# Patient Record
Sex: MALE | Race: WHITE | NOT HISPANIC OR LATINO | Employment: OTHER | URBAN - METROPOLITAN AREA
[De-identification: names, ages, dates, MRNs, and addresses within clinical notes are randomized per-mention and may not be internally consistent; named-entity substitution may affect disease eponyms.]

---

## 2017-04-13 DIAGNOSIS — E78.5 HYPERLIPIDEMIA LDL GOAL <130: ICD-10-CM

## 2017-04-13 RX ORDER — ATORVASTATIN CALCIUM 20 MG/1
TABLET, FILM COATED ORAL
Qty: 45 TABLET | Refills: 1 | Status: SHIPPED | OUTPATIENT
Start: 2017-04-13 | End: 2018-01-06

## 2017-04-13 NOTE — TELEPHONE ENCOUNTER
Atorvastatin 20mg     Last Written Prescription Date: 04/12/2016 #45 x 3  Last filled - not provided, e-refill request  Last Office Visit with [facility], [facility] or [facility] prescribing provider: 04/18/2017 DANETTE Arias    Next 5 appointments (look out 90 days)     Apr 18, 2017  7:40 AM CDT   PHYSICAL with Tamiko Arias MD   [facility] ([facility])    [address] [facility] [address] [phone]                   Lab Results   Component Value Date    CHOL 153 04/12/2016     Lab Results   Component Value Date    HDL 47 04/12/2016     Lab Results   Component Value Date    LDL 86 04/12/2016     Lab Results   Component Value Date    TRIG 99 04/12/2016     Lab Results   Component Value Date    CHOLHDLRATIO 3.0 06/17/2014

## 2017-04-13 NOTE — TELEPHONE ENCOUNTER
Prescription approved per [facility] Refill Protocol or patient Primary care provider (PCP)  MEGHNA Landers RN/Maximo Morin

## 2017-04-24 ENCOUNTER — OFFICE VISIT (OUTPATIENT)
Dept: FAMILY MEDICINE | Facility: CLINIC | Age: 62
End: 2017-04-24
Payer: COMMERCIAL

## 2017-04-24 VITALS
TEMPERATURE: 98.3 F | SYSTOLIC BLOOD PRESSURE: 130 MMHG | BODY MASS INDEX: 26.22 KG/M2 | HEART RATE: 68 BPM | DIASTOLIC BLOOD PRESSURE: 76 MMHG | HEIGHT: 68 IN | WEIGHT: 173 LBS

## 2017-04-24 DIAGNOSIS — Z79.1 NSAID LONG-TERM USE: ICD-10-CM

## 2017-04-24 DIAGNOSIS — Z12.5 SCREENING FOR PROSTATE CANCER: ICD-10-CM

## 2017-04-24 DIAGNOSIS — G25.0 BENIGN ESSENTIAL TREMOR: Primary | ICD-10-CM

## 2017-04-24 DIAGNOSIS — E78.5 HYPERLIPIDEMIA LDL GOAL <160: ICD-10-CM

## 2017-04-24 DIAGNOSIS — M54.16 LUMBAR RADICULOPATHY: ICD-10-CM

## 2017-04-24 LAB
ANION GAP SERPL CALCULATED.3IONS-SCNC: 8 MMOL/L (ref 3–14)
BUN SERPL-MCNC: 19 MG/DL (ref 7–30)
CALCIUM SERPL-MCNC: 9.1 MG/DL (ref 8.5–10.1)
CHLORIDE SERPL-SCNC: 105 MMOL/L (ref 94–109)
CHOLEST SERPL-MCNC: 132 MG/DL
CO2 SERPL-SCNC: 29 MMOL/L (ref 20–32)
CREAT SERPL-MCNC: 0.8 MG/DL (ref 0.66–1.25)
GFR SERPL CREATININE-BSD FRML MDRD: NORMAL ML/MIN/1.7M2
GLUCOSE SERPL-MCNC: 94 MG/DL (ref 70–99)
HDLC SERPL-MCNC: 58 MG/DL
LDLC SERPL CALC-MCNC: 62 MG/DL
NONHDLC SERPL-MCNC: 74 MG/DL
POTASSIUM SERPL-SCNC: 4 MMOL/L (ref 3.4–5.3)
PSA SERPL-ACNC: 0.57 UG/L (ref 0–4)
SODIUM SERPL-SCNC: 142 MMOL/L (ref 133–144)
TRIGL SERPL-MCNC: 59 MG/DL

## 2017-04-24 PROCEDURE — 99214 OFFICE O/P EST MOD 30 MIN: CPT | Performed by: FAMILY MEDICINE

## 2017-04-24 PROCEDURE — 80061 LIPID PANEL: CPT | Performed by: FAMILY MEDICINE

## 2017-04-24 PROCEDURE — G0103 PSA SCREENING: HCPCS | Performed by: FAMILY MEDICINE

## 2017-04-24 PROCEDURE — 80048 BASIC METABOLIC PNL TOTAL CA: CPT | Performed by: FAMILY MEDICINE

## 2017-04-24 PROCEDURE — 36415 COLL VENOUS BLD VENIPUNCTURE: CPT | Performed by: FAMILY MEDICINE

## 2017-04-24 RX ORDER — PROPRANOLOL HYDROCHLORIDE 80 MG/1
80 CAPSULE, EXTENDED RELEASE ORAL 2 TIMES DAILY
Qty: 180 CAPSULE | Refills: 3 | Status: SHIPPED | OUTPATIENT
Start: 2017-04-24

## 2017-04-24 NOTE — PROGRESS NOTES
"  SUBJECTIVE:                                                    Clinton Suarez is a 61 year old male who presents to clinic today for the following health issues:      Hyperlipidemia Follow-Up  Atorvastatin 10mg qd    Rate your low fat/cholesterol diet?: not monitoring fat    Taking statin?  Yes, no muscle aches from statin    Other lipid medications/supplements?:  none     Medication Followup of essential tremor  Propranolol 80 mg qd    Taking Medication as prescribed: yes    Side Effects:  None    Medication Helping Symptoms:  yes       Amount of exercise or physical activity: 2-3 days/week for an average of 15-30 minutes    Problems taking medications regularly: No    Medication side effects: none    Diet: regular (no restrictions)    - Patient states that his back pain is doing better. He continues to exercise and stretch routinely.       ROS:  Constitutional, HEENT, cardiovascular, pulmonary, gi and gu systems are negative, except as otherwise noted.    This document serves as a record of the services and decisions personally performed and made by Tamiko Arias MD. It was created on his behalf by Flash Ewing, a trained medical scribe. The creation of this document is based the provider's statements to the medical scribe.  Flash Ewing 9:03 AM April 24, 2017      OBJECTIVE:                                                    /76  Pulse 68  Temp 98.3  F (36.8  C) (Tympanic)  Ht 5' 8\" (1.727 m)  Wt 173 lb (78.5 kg)  BMI 26.3 kg/m2  Body mass index is 26.3 kg/(m^2).     GENERAL: healthy, alert and no distress  EYES: Eyes grossly normal to inspection, conjunctivae and sclerae normal  RESP: lungs clear to auscultation - no rales, rhonchi or wheezes  CV: regular rate and rhythm, normal S1 S2, no murmur  MS: no gross musculoskeletal defects noted, no edema  NEURO: Normal strength and tone, mentation intact and speech normal  PSYCH: mentation appears normal, affect normal/bright     "     ASSESSMENT/PLAN:                                                      (G25.0) Benign essential tremor  (primary encounter diagnosis)  Comment: Patient states that symptoms well controlled with current medication. Medication refilled.  Plan: propranolol (INDERAL LA) 80 MG 24 hr capsule    (E78.5) Hyperlipidemia LDL goal <160  Comment: Patient is doing well with moderate intensity statin therapy. Primary prevention.  Plan: Continue to monitor.     (M54.16) Lumbar radiculopathy  Comment: Patient states that his back pain continues to improve. I advised the patient to continue with his home exercise program. Reviewed the need for weight management in the prevention of chronic pain.  Plan: Continue to monitor.       Patient Instructions   *   Keep up with exercises and stretches for the back.     *   Will check cholesterol, blood sugar, kidney function.     *   Colonoscopy in 2021.     *    Yearly check up.     *    No change in medications.         Patient will follow up in 12 months or sooner, PRN. Patient instructed to call with any questions or concerns.    The information in this document, created by a scribe for me, accurately reflects the services I personally performed and the decisions made by me. I have reviewed and approved this document for accuracy. 9:04 AM 4/24/2017      Tamiko Arias MD  [facility]

## 2017-04-24 NOTE — NURSING NOTE
"Chief Complaint   Patient presents with     Lipids       Initial /76  Pulse 68  Temp 98.3  F (36.8  C) (Tympanic)  Ht 5' 8\" (1.727 m)  Wt 173 lb (78.5 kg)  BMI 26.3 kg/m2 Estimated body mass index is 26.3 kg/(m^2) as calculated from the following:    Height as of this encounter: 5' 8\" (1.727 m).    Weight as of this encounter: 173 lb (78.5 kg).  Medication Reconciliation: complete  "

## 2017-04-24 NOTE — MR AVS SNAPSHOT
After Visit Summary   4/24/2017    Clinton Suarez    MRN: [id number]           Patient Information     Date Of Birth          1955        Visit Information        Provider Department      4/24/2017 8:40 AM Tamiko Arias MD [facility]        Today's Diagnoses     Benign essential tremor    -  1    Hyperlipidemia LDL goal <160        Lumbar radiculopathy          Care Instructions    *   Keep up with exercises and stretches for the back.     *   Will check cholesterol, blood sugar, kidney function.     *   Colonoscopy in 2021.     *    Yearly check up.     *    No change in medications.         Follow-ups after your visit        Who to contact     Normal or non-critical lab and imaging results will be communicated to you by [employer], letter or phone within 4 business days after the clinic has received the results. If you do not hear from us within 7 days, please contact the clinic through [employer] or phone. If you have a critical or abnormal lab result, we will notify you by phone as soon as possible.  Submit refill requests through [employer] or call your pharmacy and they will forward the refill request to us. Please allow 3 business days for your refill to be completed.          If you need to speak with a  for additional information , please call: [phone]           Additional Information About Your Visit        [employer] Information     [employer] gives you secure access to your electronic health record. If you see a primary care provider, you can also send messages to your care team and make appointments. If you have questions, please call your primary care clinic.  If you do not have a primary care provider, please call [phone] and they will assist you.        Care EveryWhere ID     This is your Care EveryWhere ID. This could be used by other organizations to access your [place] medical records  [id number]        Your Vitals Were     Pulse  "Temperature Height BMI (Body Mass Index)          68 98.3  F (36.8  C) (Tympanic) 5' 8\" (1.727 m) 26.3 kg/m2         Blood Pressure from Last 3 Encounters:   04/24/17 130/76   04/12/16 138/80   06/19/14 120/74    Weight from Last 3 Encounters:   04/24/17 173 lb (78.5 kg)   04/12/16 165 lb 4 oz (75 kg)   06/19/14 172 lb (78 kg)              Today, you had the following     No orders found for display         Today's Medication Changes          These changes are accurate as of: 4/24/17  9:16 AM.  If you have any questions, ask your nurse or doctor.               These medicines have changed or have updated prescriptions.        Dose/Directions    propranolol 80 MG 24 hr capsule   Commonly known as:  INDERAL LA   This may have changed:    - how much to take  - how to take this  - when to take this  - additional instructions   Used for:  Benign essential tremor   Changed by:  Tamiko Arias MD        Dose:  80 mg   Take 1 capsule (80 mg) by mouth 2 times daily   Quantity:  180 capsule   Refills:  3         Stop taking these medicines if you haven't already. Please contact your care team if you have questions.     diclofenac 75 MG EC tablet   Commonly known as:  VOLTAREN   Stopped by:  Tamiko Arias MD           gabapentin 300 MG capsule   Commonly known as:  NEURONTIN   Stopped by:  Tamiko Arias MD           predniSONE 20 MG tablet   Commonly known as:  DELTASONE   Stopped by:  Tamiko Arias MD                Where to get your medicines      These medications were sent to [facility] - [address], [facility] [address]     Phone:  [phone]     propranolol 80 MG 24 hr capsule                Primary Care Provider Office Phone # Fax #    Tamiko Arias [phone]       [facility] [address] [place] [address]        Thank you!     Thank you for choosing [facility]  for your " care. Our goal is always to provide you with excellent care. Hearing back from our patients is one way we can continue to improve our services. Please take a few minutes to complete the written survey that you may receive in the mail after your visit with us. Thank you!             Your Updated Medication List - Protect others around you: Learn how to safely use, store and throw away your medicines at www.disposemymeds.org.          This list is accurate as of: 4/24/17  9:16 AM.  Always use your most recent med list.                   Brand Name Dispense Instructions for use    aspirin 81 MG tablet      Take 1 tablet by mouth daily.       atorvastatin 20 MG tablet    LIPITOR    45 tablet    Take one-half tablet by  mouth daily FOR  CHOLESTEROL.       propranolol 80 MG 24 hr capsule    INDERAL LA    180 capsule    Take 1 capsule (80 mg) by mouth 2 times daily

## 2017-04-24 NOTE — PATIENT INSTRUCTIONS
*   Keep up with exercises and stretches for the back.     *   Will check cholesterol, blood sugar, kidney function.     *   Colonoscopy in 2021.     *    Yearly check up.     *    No change in medications.

## 2018-01-06 DIAGNOSIS — E78.5 HYPERLIPIDEMIA LDL GOAL <130: ICD-10-CM

## 2018-01-08 RX ORDER — ATORVASTATIN CALCIUM 20 MG/1
TABLET, FILM COATED ORAL
Qty: 45 TABLET | Refills: 0 | Status: SHIPPED | OUTPATIENT
Start: 2018-01-08 | End: 2018-04-19

## 2018-01-08 NOTE — TELEPHONE ENCOUNTER
Requested Prescriptions   Pending Prescriptions Disp Refills     atorvastatin (LIPITOR) 20 MG tablet [Pharmacy Med Name: ATORVASTATIN  20MG  TAB] 45 tablet     Last Written Prescription Date:  04/13/2017 #45 x 1  Last filled 11/13/2017  Last Office Visit with [facility], [facility] or [facility] prescribing provider:  04/24/2017 DANETTE Arias   Future Office Visit:  none    Sig: TAKE ONE-HALF TABLET BY  MOUTH DAILY FOR  CHOLESTEROL.    Statins Protocol Passed    1/6/2018  2:57 AM       Passed - LDL on file in past 12 months    Recent Labs   Lab Test  04/24/17   0920   LDL  62            Passed - No abnormal creatine kinase in past 12 months    No lab results found.         Passed - Recent or future visit with authorizing provider    Patient had office visit in the last year or has a visit in the next 30 days with authorizing provider.  See chart review.              Passed - Patient is age 18 or older

## 2018-01-08 NOTE — TELEPHONE ENCOUNTER
Prescription approved per [facility] Refill Protocol.    Carol HARTLEY RN

## 2018-04-19 DIAGNOSIS — E78.5 HYPERLIPIDEMIA LDL GOAL <130: ICD-10-CM

## 2018-04-19 RX ORDER — ATORVASTATIN CALCIUM 20 MG/1
TABLET, FILM COATED ORAL
Qty: 45 TABLET | Refills: 0 | Status: SHIPPED | OUTPATIENT
Start: 2018-04-19

## 2018-04-19 NOTE — TELEPHONE ENCOUNTER
Medication is being filled for 1 time refill only due to:   Over due for office visit and/or labs   MEGHNA Landers  RN/Maximo Morin

## 2018-04-19 NOTE — TELEPHONE ENCOUNTER
"Requested Prescriptions   Pending Prescriptions Disp Refills     atorvastatin (LIPITOR) 20 MG tablet [Pharmacy Med Name: ATORVASTATIN  20MG  TAB] 45 tablet     Last Written Prescription Date:  01/08/2018 #45 x 0  Last filled 02/24/2018  Last office visit: 4/24/2017 DANETTE Arias   Future Office Visit:  None   Sig: TAKE ONE-HALF TABLET BY  MOUTH DAILY FOR  CHOLESTEROL.    Statins Protocol Passed    4/19/2018  4:21 AM       Passed - LDL on file in past 12 months    Recent Labs   Lab Test  04/24/17   0920   LDL  62            Passed - No abnormal creatine kinase in past 12 months    No lab results found.            Passed - Recent (12 mo) or future (30 days) visit within the authorizing provider's specialty    Patient had office visit in the last 12 months or has a visit in the next 30 days with authorizing provider or within the authorizing provider's specialty.  See \"Patient Info\" tab in inbasket, or \"Choose Columns\" in Meds & Orders section of the refill encounter.           Passed - Patient is age 18 or older          "

## 2019-10-14 ENCOUNTER — TELEPHONE (OUTPATIENT)
Dept: FAMILY MEDICINE | Facility: CLINIC | Age: 64
End: 2019-10-14

## 2019-10-14 NOTE — TELEPHONE ENCOUNTER
Pt is calling and states that he was bit by a tick in September and is now exhibiting flu like sx, please call to triage.     Celia Nair, [place]

## 2019-10-14 NOTE — TELEPHONE ENCOUNTER
SUBJECTIVE:   Clinton Suarez is a 64 year old male who calls  complaining of viral symptoms: , chills, fatigue, nausea NO vomiting NO GI or   sx  and body aches  for 2 days.  Sudden on set yesterday    Denies dyspnea or wheezing.  Has a history of a tick bite mid Sept  About 6 weeks ago,  Not sure how long it was attached,  No sx developed at that time   OBJECTIVE:  Sounds mildly ill but not toxic.    ASSESSMENT:  Viral illness day 2        PLAN:  Symptomatic therapy suggested: rest, increase fluids, OTC acetaminophen, ibuprofen, bland diet, gradual reintroduction of usual activities, small amounts of clear fluids frequently, gradual reintroduction of usual diet and call prn if symptoms persist or worsen.       Call or return to clinic prn if these symptoms worsen or fail to improve as anticipated.    Pt ok with home care     MEGHNA Walters  Clinic  RN/Maximo Morin

## 2019-11-08 ENCOUNTER — HEALTH MAINTENANCE LETTER (OUTPATIENT)
Age: 64
End: 2019-11-08

## 2020-02-23 ENCOUNTER — HEALTH MAINTENANCE LETTER (OUTPATIENT)
Age: 65
End: 2020-02-23

## 2020-12-06 ENCOUNTER — HEALTH MAINTENANCE LETTER (OUTPATIENT)
Age: 65
End: 2020-12-06

## 2021-04-11 ENCOUNTER — HEALTH MAINTENANCE LETTER (OUTPATIENT)
Age: 66
End: 2021-04-11

## 2022-05-07 ENCOUNTER — HEALTH MAINTENANCE LETTER (OUTPATIENT)
Age: 67
End: 2022-05-07

## 2023-04-22 ENCOUNTER — HEALTH MAINTENANCE LETTER (OUTPATIENT)
Age: 68
End: 2023-04-22

## 2023-06-02 ENCOUNTER — HEALTH MAINTENANCE LETTER (OUTPATIENT)
Age: 68
End: 2023-06-02

## 2024-06-29 ENCOUNTER — HEALTH MAINTENANCE LETTER (OUTPATIENT)
Age: 69
End: 2024-06-29

## 2024-08-19 ENCOUNTER — TRANSCRIBE ORDERS (OUTPATIENT)
Dept: OTHER | Age: 69
End: 2024-08-19

## 2024-08-19 DIAGNOSIS — R25.1 TREMOR, UNSPECIFIED: Primary | ICD-10-CM

## 2025-07-13 ENCOUNTER — HEALTH MAINTENANCE LETTER (OUTPATIENT)
Age: 70
End: 2025-07-13